# Patient Record
Sex: FEMALE | Race: WHITE | NOT HISPANIC OR LATINO | URBAN - METROPOLITAN AREA
[De-identification: names, ages, dates, MRNs, and addresses within clinical notes are randomized per-mention and may not be internally consistent; named-entity substitution may affect disease eponyms.]

---

## 2023-12-01 ENCOUNTER — ANCILLARY PROCEDURE (OUTPATIENT)
Dept: RADIOLOGY | Facility: CLINIC | Age: 63
End: 2023-12-01
Payer: COMMERCIAL

## 2023-12-01 DIAGNOSIS — Z12.31 ENCOUNTER FOR SCREENING MAMMOGRAM FOR MALIGNANT NEOPLASM OF BREAST: ICD-10-CM

## 2023-12-01 PROCEDURE — 77067 SCR MAMMO BI INCL CAD: CPT

## 2023-12-01 PROCEDURE — 77063 BREAST TOMOSYNTHESIS BI: CPT | Mod: BILATERAL PROCEDURE | Performed by: RADIOLOGY

## 2023-12-01 PROCEDURE — 77067 SCR MAMMO BI INCL CAD: CPT | Mod: BILATERAL PROCEDURE | Performed by: RADIOLOGY

## 2025-01-09 ENCOUNTER — TELEPHONE (OUTPATIENT)
Dept: OBSTETRICS AND GYNECOLOGY | Facility: CLINIC | Age: 65
End: 2025-01-09
Payer: COMMERCIAL

## 2025-01-09 DIAGNOSIS — Z12.31 ENCOUNTER FOR SCREENING MAMMOGRAM FOR BREAST CANCER: Primary | ICD-10-CM

## 2025-01-14 ENCOUNTER — HOSPITAL ENCOUNTER (OUTPATIENT)
Dept: RADIOLOGY | Facility: CLINIC | Age: 65
Discharge: HOME | End: 2025-01-14
Payer: COMMERCIAL

## 2025-01-14 VITALS — WEIGHT: 132 LBS | HEIGHT: 66 IN | BODY MASS INDEX: 21.21 KG/M2

## 2025-01-14 DIAGNOSIS — Z12.31 ENCOUNTER FOR SCREENING MAMMOGRAM FOR BREAST CANCER: ICD-10-CM

## 2025-01-14 PROCEDURE — 77063 BREAST TOMOSYNTHESIS BI: CPT | Performed by: RADIOLOGY

## 2025-01-14 PROCEDURE — 77067 SCR MAMMO BI INCL CAD: CPT | Performed by: RADIOLOGY

## 2025-01-14 PROCEDURE — 77067 SCR MAMMO BI INCL CAD: CPT

## 2025-01-23 ENCOUNTER — APPOINTMENT (OUTPATIENT)
Dept: OBSTETRICS AND GYNECOLOGY | Facility: CLINIC | Age: 65
End: 2025-01-23
Payer: COMMERCIAL

## 2025-01-24 ENCOUNTER — APPOINTMENT (OUTPATIENT)
Dept: OBSTETRICS AND GYNECOLOGY | Facility: CLINIC | Age: 65
End: 2025-01-24
Payer: COMMERCIAL

## 2025-01-24 VITALS
SYSTOLIC BLOOD PRESSURE: 126 MMHG | HEIGHT: 66 IN | WEIGHT: 142 LBS | BODY MASS INDEX: 22.82 KG/M2 | DIASTOLIC BLOOD PRESSURE: 68 MMHG

## 2025-01-24 DIAGNOSIS — Z01.419 ENCOUNTER FOR WELL WOMAN EXAM WITH ROUTINE GYNECOLOGICAL EXAM: Primary | ICD-10-CM

## 2025-01-24 DIAGNOSIS — Z12.4 CERVICAL CANCER SCREENING: ICD-10-CM

## 2025-01-24 DIAGNOSIS — Z12.31 ENCOUNTER FOR SCREENING MAMMOGRAM FOR BREAST CANCER: ICD-10-CM

## 2025-01-24 PROCEDURE — 3008F BODY MASS INDEX DOCD: CPT | Performed by: OBSTETRICS & GYNECOLOGY

## 2025-01-24 PROCEDURE — 1036F TOBACCO NON-USER: CPT | Performed by: OBSTETRICS & GYNECOLOGY

## 2025-01-24 PROCEDURE — 99396 PREV VISIT EST AGE 40-64: CPT | Performed by: OBSTETRICS & GYNECOLOGY

## 2025-01-24 RX ORDER — FERROUS SULFATE 325(65) MG
1 TABLET ORAL 2 TIMES DAILY
COMMUNITY
Start: 2017-01-04

## 2025-01-24 RX ORDER — CARVEDILOL 3.12 MG/1
3.12 TABLET ORAL 2 TIMES DAILY
COMMUNITY

## 2025-01-24 RX ORDER — APIXABAN 2.5 MG/1
2.5 TABLET, FILM COATED ORAL 2 TIMES DAILY
COMMUNITY

## 2025-01-24 RX ORDER — CHOLECALCIFEROL (VITAMIN D3) 25 MCG
2000 TABLET ORAL
COMMUNITY
Start: 2023-07-21

## 2025-01-24 RX ORDER — TACROLIMUS 1 MG/1
2 CAPSULE ORAL 2 TIMES DAILY
COMMUNITY
Start: 2024-12-30

## 2025-01-24 RX ORDER — SERTRALINE HYDROCHLORIDE 100 MG/1
200 TABLET, FILM COATED ORAL NIGHTLY
COMMUNITY

## 2025-01-24 RX ORDER — NALTREXONE HYDROCHLORIDE 50 MG/1
50 TABLET, FILM COATED ORAL
COMMUNITY
Start: 2024-12-02

## 2025-01-24 RX ORDER — MYCOPHENOLATE MOFETIL 250 MG/1
250 CAPSULE ORAL 2 TIMES DAILY
COMMUNITY
Start: 2025-01-08

## 2025-01-24 RX ORDER — POLYETHYLENE GLYCOL 3350 17 G/17G
17 POWDER, FOR SOLUTION ORAL 2 TIMES DAILY
COMMUNITY
Start: 2024-12-27

## 2025-01-24 NOTE — PROGRESS NOTES
ASSESSMENT/PLAN    1. Encounter for well woman exam with routine gynecological exam (Primary)  Routine well woman visit.   Your exam was normal today.   A pap and HPV test was done. If you are signed onto the  MY CHART, you will be notified about your pap results through the MY CHART in 2-3 weeks.      2. Encounter for screening mammogram for breast cancer  Your clinical breast exam was normal.   Your recent mammogram was normal.  A screening mammogram order has been placed for 2026.        ----------------------------------------------------------------------------------------------------    SUBJECTIVE    PAP 22 NEG,HPV NEG  MAMMO 2025  DEXA 3-3-22 by PCP ordered. T=-2.6 on Prolia.  COLON  [facility] - AFTER . NOT SURE OF DATE.     HPI    63 yo  for routine well woman visit.   Last visit 2023.  Menopausal since age 49. Denies any bleeding.   H/o cholangiocarcinoma of the biliary tract.   s/p liver transplant at [facility] .  Bloodwork and maintenance visits yearly. This past year dxed with hepatitis E. Fatigue, intermittent nausea.   No GYN complaints. No bleeding. Remote h/o abnormal pap, last pap  nl and HPV negative. Pts [facility] transplant provider wants her to have yearly paps.   Continues on Prolia for osteoporosis.  , nonsmoker, no ETOH.       REVIEW OF SYSTEMS    Constitutional: no recent weight gain, + fatigue.   ENT: no hearing loss.  Cardiovascular: no chest pain, no palpitations.  Respiratory: no shortness of breath.  Gastrointestinal: no abdominal pain, no constipation, + nausea, no diarrhea.  Musculoskeletal: no back pain.  Lymphatic: no swollen glands.  Genitourinary: no pelvic pain, no urinary urgency, no urinary incontinence, no change in urinary frequency, no vaginal dryness, no vaginal itching,  no vaginal discharge, no unexplained vaginal bleeding, no lesion/sore.   Breasts: no breast pain, no nipple discharge, no breast lump.   Neurological: no headache, no numbness,  "no dizziness.   Psychiatric: no sleep disturbances, no anxiety, + depression.   Endocrine: + hot flashes, no loss of hair, no hirsutism.       OBJECTIVE    /68   Ht 1.676 m (5' 6\")   Wt 64.4 kg (142 lb)   LMP  (LMP Unknown)   BMI 22.92 kg/m²      Physical Exam     Constitutional: Alert and in no acute distress. Well developed, well nourished   Head and Face: Head and face: normal   Eyes: Normal external exam - nonicteric sclera.  Ears, Nose, Mouth, and Throat: External inspection of ears and nose: normal   Neck: no neck asymmetry. Supple and thyroid not enlarged and there were no palpable thyroid nodules   Cardiovascular: Heart rate and rhythm were normal  Pulmonary: No respiratory distress and clear bilateral breath sounds   Chest: Breasts: normal appearance, no nipple discharge, no skin changes. Palpation of breasts and axillae: no palpable mass, no axillary lymphadenopathy   Abdomen: soft nontender; no abdominal mass palpated, no organomegaly, + hernia right side of abdomen.    Genitourinary: external genitalia: normal appearing vulva and labia without lesions. No inguinal lymphadenopathy,    Urethra: normal.   Bladder: normal on palpation.   Perianal area: normal   Vagina: normal, without significant discharge, no lesions.  Cervix: Normal appearing without lesions.  Uterus: Normal, mobile, nontender.  Right and left adnexa/parametria: Normal  Skin: normal skin color and pigmentation, normal skin turgor, and no rash  Psychiatric: alert and oriented x 3., affect normal to patient baseline and mood: appropriate        Nora Scherer MD    "

## 2026-01-15 ENCOUNTER — APPOINTMENT (OUTPATIENT)
Dept: RADIOLOGY | Facility: CLINIC | Age: 66
End: 2026-01-15
Payer: COMMERCIAL